# Patient Record
Sex: MALE
[De-identification: names, ages, dates, MRNs, and addresses within clinical notes are randomized per-mention and may not be internally consistent; named-entity substitution may affect disease eponyms.]

---

## 2018-08-25 ENCOUNTER — HOSPITAL ENCOUNTER (EMERGENCY)
Dept: HOSPITAL 45 - C.EDA | Age: 33
Discharge: HOME | End: 2018-08-25
Payer: COMMERCIAL

## 2018-08-25 VITALS
WEIGHT: 232.15 LBS | HEIGHT: 72.01 IN | WEIGHT: 232.15 LBS | BODY MASS INDEX: 31.44 KG/M2 | BODY MASS INDEX: 31.44 KG/M2 | HEIGHT: 72.01 IN

## 2018-08-25 VITALS — SYSTOLIC BLOOD PRESSURE: 172 MMHG | DIASTOLIC BLOOD PRESSURE: 106 MMHG | OXYGEN SATURATION: 100 % | HEART RATE: 100 BPM

## 2018-08-25 VITALS — TEMPERATURE: 97.52 F

## 2018-08-25 DIAGNOSIS — S61.219A: ICD-10-CM

## 2018-08-25 DIAGNOSIS — M25.552: Primary | ICD-10-CM

## 2018-08-25 DIAGNOSIS — Z87.891: ICD-10-CM
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
